# Patient Record
Sex: MALE | Race: WHITE | NOT HISPANIC OR LATINO | Employment: OTHER | URBAN - METROPOLITAN AREA
[De-identification: names, ages, dates, MRNs, and addresses within clinical notes are randomized per-mention and may not be internally consistent; named-entity substitution may affect disease eponyms.]

---

## 2023-10-12 ENCOUNTER — APPOINTMENT (OUTPATIENT)
Dept: OTOLARYNGOLOGY | Facility: CLINIC | Age: 88
End: 2023-10-12
Payer: MEDICARE

## 2023-11-05 PROBLEM — M25.561 PAIN IN RIGHT KNEE: Status: ACTIVE | Noted: 2023-11-05

## 2023-11-05 PROBLEM — R00.2 PALPITATIONS: Status: ACTIVE | Noted: 2023-11-05

## 2023-11-05 PROBLEM — I10 ESSENTIAL HYPERTENSION: Status: ACTIVE | Noted: 2023-11-05

## 2023-11-05 PROBLEM — I25.118 ATHEROSCLEROTIC HEART DISEASE OF NATIVE CORONARY ARTERY WITH OTHER FORMS OF ANGINA PECTORIS (CMS-HCC): Status: ACTIVE | Noted: 2023-11-05

## 2023-11-05 PROBLEM — I48.20 CHRONIC ATRIAL FIBRILLATION (MULTI): Status: ACTIVE | Noted: 2023-11-05

## 2023-11-05 PROBLEM — M25.562 PAIN IN LEFT KNEE: Status: ACTIVE | Noted: 2023-11-05

## 2023-11-05 PROBLEM — M17.11 OSTEOARTHRITIS OF RIGHT KNEE: Status: ACTIVE | Noted: 2023-11-05

## 2023-11-05 PROBLEM — I50.21 ACUTE SYSTOLIC HEART FAILURE (MULTI): Status: ACTIVE | Noted: 2023-11-05

## 2023-11-05 PROBLEM — M47.812 NECK ARTHRITIS: Status: ACTIVE | Noted: 2023-11-05

## 2023-11-05 PROBLEM — E03.9 HYPOTHYROID: Status: ACTIVE | Noted: 2023-11-05

## 2023-11-05 PROBLEM — R26.89 POOR BALANCE: Status: ACTIVE | Noted: 2023-11-05

## 2023-11-05 PROBLEM — M17.12 OSTEOARTHRITIS OF LEFT KNEE: Status: ACTIVE | Noted: 2023-11-05

## 2023-11-05 PROBLEM — M10.9 GOUT: Status: ACTIVE | Noted: 2023-11-05

## 2023-11-05 PROBLEM — M15.9 OSTEOARTHRITIS OF MULTIPLE JOINTS: Status: ACTIVE | Noted: 2023-11-05

## 2023-11-05 PROBLEM — N40.0 BENIGN PROSTATIC HYPERPLASIA: Status: ACTIVE | Noted: 2023-11-05

## 2023-11-05 PROBLEM — R26.81 GAIT INSTABILITY: Status: ACTIVE | Noted: 2023-11-05

## 2023-11-05 PROBLEM — N20.0 NEPHROLITHIASIS: Status: ACTIVE | Noted: 2023-11-05

## 2023-11-05 PROBLEM — M85.89 OTHER SPECIFIED DISORDERS OF BONE DENSITY AND STRUCTURE, MULTIPLE SITES: Status: ACTIVE | Noted: 2023-11-05

## 2023-11-05 PROBLEM — R06.02 SHORTNESS OF BREATH: Status: ACTIVE | Noted: 2023-11-05

## 2023-11-05 RX ORDER — ALLOPURINOL 100 MG/1
1 TABLET ORAL DAILY
COMMUNITY
Start: 2022-07-01

## 2023-11-05 RX ORDER — CARVEDILOL 3.12 MG/1
1 TABLET ORAL
COMMUNITY
Start: 2022-07-01

## 2023-11-05 RX ORDER — ATENOLOL AND CHLORTHALIDONE TABLET 50; 25 MG/1; MG/1
1 TABLET ORAL DAILY
COMMUNITY

## 2023-11-05 RX ORDER — METOLAZONE 5 MG/1
5 TABLET ORAL 2 TIMES WEEKLY
COMMUNITY
Start: 2022-07-01

## 2023-11-05 RX ORDER — LEVOTHYROXINE SODIUM 25 UG/1
1 TABLET ORAL DAILY
COMMUNITY
Start: 2022-07-01

## 2023-11-05 RX ORDER — SPIRONOLACTONE 25 MG/1
12.5 TABLET ORAL DAILY
COMMUNITY

## 2023-11-05 RX ORDER — ATENOLOL AND CHLORTHALIDONE TABLET 100; 25 MG/1; MG/1
1 TABLET ORAL DAILY
COMMUNITY

## 2023-11-05 RX ORDER — POTASSIUM CHLORIDE 750 MG/1
1 TABLET, FILM COATED, EXTENDED RELEASE ORAL DAILY
COMMUNITY
Start: 2022-10-14

## 2023-11-05 RX ORDER — TORSEMIDE 20 MG/1
20 TABLET ORAL 2 TIMES DAILY
COMMUNITY
Start: 2023-09-20 | End: 2023-12-19

## 2023-11-05 RX ORDER — COLCHICINE 0.6 MG/1
0.6 TABLET ORAL 2 TIMES WEEKLY
COMMUNITY
Start: 2022-10-14

## 2023-11-07 ENCOUNTER — CLINICAL SUPPORT (OUTPATIENT)
Dept: AUDIOLOGY | Facility: CLINIC | Age: 88
End: 2023-11-07
Payer: MEDICARE

## 2023-11-07 ENCOUNTER — OFFICE VISIT (OUTPATIENT)
Dept: OTOLARYNGOLOGY | Facility: CLINIC | Age: 88
End: 2023-11-07
Payer: MEDICARE

## 2023-11-07 VITALS — WEIGHT: 202 LBS

## 2023-11-07 DIAGNOSIS — H90.3 SENSORINEURAL HEARING LOSS (SNHL) OF BOTH EARS: ICD-10-CM

## 2023-11-07 DIAGNOSIS — H90.3 ASYMMETRICAL SENSORINEURAL HEARING LOSS: Primary | ICD-10-CM

## 2023-11-07 DIAGNOSIS — R13.14 PHARYNGOESOPHAGEAL DYSPHAGIA: Primary | ICD-10-CM

## 2023-11-07 DIAGNOSIS — H61.23 BILATERAL IMPACTED CERUMEN: ICD-10-CM

## 2023-11-07 PROCEDURE — 92567 TYMPANOMETRY: CPT | Performed by: AUDIOLOGIST

## 2023-11-07 PROCEDURE — 1036F TOBACCO NON-USER: CPT | Performed by: OTOLARYNGOLOGY

## 2023-11-07 PROCEDURE — 92557 COMPREHENSIVE HEARING TEST: CPT | Performed by: AUDIOLOGIST

## 2023-11-07 PROCEDURE — 99203 OFFICE O/P NEW LOW 30 MIN: CPT | Performed by: OTOLARYNGOLOGY

## 2023-11-07 PROCEDURE — 1159F MED LIST DOCD IN RCRD: CPT | Performed by: OTOLARYNGOLOGY

## 2023-11-07 NOTE — PROGRESS NOTES
Chief Complaint   Patient presents with    Hearing Problem     NP- HEARING CK, HAD AUDIO DONE      HPI  Uzair Enrique is a 89 y.o. male who is here for hearing loss.  He has bilateral severe sensorineural hearing loss with poor word recognition of 20% on the right and 64% on the left.  He has hearing aids from [employer] that are 1-year-old.  He is doing okay with this.  He has occasional difficulty swallowing meat.  No change in voice      No past medical history on file.         Medications:     Current Outpatient Medications:     allopurinol (Zyloprim) 100 mg tablet, Take 1 tablet (100 mg) by mouth once daily., Disp: , Rfl:     apixaban (Eliquis) 2.5 mg tablet, Take 1 tablet (2.5 mg) by mouth 2 times a day., Disp: , Rfl:     atenoloL-chlorthalidone (Tenoretic 100) 100-25 mg tablet, Take 1 tablet by mouth once daily., Disp: , Rfl:     atenoloL-chlorthalidone (Tenoretic) 50-25 mg tablet, Take 1 tablet by mouth once daily., Disp: , Rfl:     carvedilol (Coreg) 3.125 mg tablet, Take 1 tablet (3.125 mg) by mouth 2 times a day with meals., Disp: , Rfl:     colchicine, gout, 0.6 mg tablet, Take 1 tablet (0.6 mg) by mouth 2 times a week., Disp: , Rfl:     levothyroxine (Synthroid, Levoxyl) 25 mcg tablet, Take 1 tablet (25 mcg) by mouth once daily., Disp: , Rfl:     metOLazone (Zaroxolyn) 5 mg tablet, Take 1 tablet (5 mg) by mouth 2 times a week., Disp: , Rfl:     potassium chloride CR 10 mEq ER tablet, Take 1 tablet (10 mEq) by mouth once daily., Disp: , Rfl:     spironolactone (Aldactone) 25 mg tablet, Take 0.5 tablets (12.5 mg) by mouth once daily., Disp: , Rfl:     torsemide (Demadex) 20 mg tablet, Take 1 tablet (20 mg) by mouth twice a day., Disp: , Rfl:      Allergies:  Allergies   Allergen Reactions    Penicillins Other     Severe arthralgia     Shrimp GI Upset    Nsaids (Non-Steroidal Anti-Inflammatory Drug) Hives    Simvastatin Myalgia and Unknown    Iodine Unknown    Penicillamine Unknown    Shellfish Derived  Unknown    Tolmetin Unknown        Physical Exam:  Last Recorded Vitals  Weight 91.6 kg (202 lb).  []General appearance: Well-developed, well-nourished in no acute distress, conversant with normal voice quality    Head/face: No erythema or edema or facial tenderness, and normal facial nerve function bilaterally    External ear: Clear external auditory canals with normal pinnae bilateral dry cerumen removed from the deep portion of the inner ear canal.  Tube status: N/A  Middle ear: Tympanic membranes intact and mobile, middle ears normal.  Tympanic membrane perforation: N/A  Mastoid bowl: N/A  Hearing: Normal conversational awareness at normal speech thresholds    Nose visualized using: Anterior rhinoscopy  Nasal dorsum: Nontraumatic midline appearance  Septum: Midline, nonobstructing  Inferior turbinates: Normal, pink  Secretions: Dry    Oral cavity and oropharynx: Normal  Teeth: Good condition  Floor of mouth: without lesions  Palate: Normal hard palate, soft palate and uvula  Oropharynx: Clear, no lesions present  Buccal mucosa: Normal without masses or lesions  Lips: Normal    Nasopharynx: Inadequate mirror exam secondary to gag/anatomy    Neck:  Salivary glands: Normal bilateral parotid and submandibular glands by inspection and palpation.  Non-thyroid masses: No palpable masses or significant lymphadenopathy  Trachea: Midline  Thyroid: No thyromegaly or palpable nodules  Temporomandibular joint: Nontender  Cervical range of motion: Normal    Neurologic exam: Alert and oriented x3, appropriate affect.  Cranial nerves II-XII normal bilaterally  Extraocular movement: Extraocular movement intact, normal gaze alignment        Problem List Items Addressed This Visit    None  Visit Diagnoses         Codes    Pharyngoesophageal dysphagia    -  Primary R13.14    Sensorineural hearing loss (SNHL) of both ears     H90.3    Bilateral impacted cerumen     H61.23          We discussed the options of cochlear implant.  He is  not interested.  I offered a modified barium swallow and he has deferred.  Continue with hearing aids.  Recheck in 1 year    Grupo Costa MD

## 2023-11-10 NOTE — PROGRESS NOTES
AUDIOLOGY ADULT AUDIOMETRIC EVALUATION    Name:  Uzair Enrique  :  1930  Age:  89 y.o.  Date of Evaluation:  2023    Reason for visit: Patient is seen in the clinic today at the request of Grupo Costa MD in otolaryngology for an audiologic evaluation.     HISTORY  The patient has [employer] hearing aids that are approximately one year old.  He reported that he feels like his ears are blocked, and he hears water in his ears.  Otalgia, aural pressure and tinnitus were denied.  He is occasionally off balance.  A history of excessive noise exposure was reported.    EVALUATION  See scanned audiogram: “Media” > “Audiology Report”.    RESULTS  Otoscopic Evaluation:  Right Ear: clear ear canal  Left Ear: clear ear canal    Immittance Measures:  Tympanometry:  Right Ear: Type Ad, hypercompliant tympanic membrane mobility with normal middle ear pressure   Left Ear: Type A, normal tympanic membrane mobility with normal middle ear pressure     Acoustic Reflexes:  Ipsilateral Right Ear: Could not evaluate since an adequate seal could not be maintained    Ipsilateral Left Ear: Could not evaluate since an adequate seal could not be maintained    Contralateral Right Ear: did not evaluate  Contralateral Left Ear: did not evaluate    Distortion Product Otoacoustic Emissions (DPOAEs):  Right Ear: did not evaluate   Left Ear: did not evaluate     Audiometry:  Test Technique and Reliability:   Standard audiometry via supra-aural headphones. Reliability is good.    Pure tone air and bone conduction audiometry:  Right Ear: moderate sloping to profound sensorineural hearing loss   Left Ear: mild sloping to profound sensorineural hearing loss     Speech Audiometry (Word Recognition Scores):   Right Ear: Extremely Poor, 20%  Left Ear: Poor, 64%    IMPRESSIONS  The patient has a significant bilateral sensorineural hearing loss with poor word recognition in both ears.      RECOMMENDATIONS  - Follow up with  otolaryngology today as scheduled.  - Consider cochlear implant evaluation.  - Annual audiologic evaluation, sooner if an acute change is noted.  - Continued hearing aid use.  - Follow-up with audiology annually for routine hearing aid maintenance, sooner if questions/problems arise.    PATIENT EDUCATION  Discussed results, impressions and recommendations with the patient. Questions were addressed and the patient was encouraged to contact our office should concerns arise.    Time for this encounter: 1:45-2:15

## 2024-05-11 ENCOUNTER — DOCUMENTATION (OUTPATIENT)
Dept: HOME HEALTH SERVICES | Facility: HOME HEALTH | Age: 89
End: 2024-05-11
Payer: MEDICARE

## 2024-05-11 ENCOUNTER — HOME HEALTH ADMISSION (OUTPATIENT)
Dept: HOME HEALTH SERVICES | Facility: HOME HEALTH | Age: 89
End: 2024-05-11
Payer: MEDICARE

## 2024-05-11 NOTE — HH CARE COORDINATION
Home Care received a Referral for Physical Therapy. We have processed the referral for a Start of Care on 5.12 or 5.13.24.     If you have any questions or concerns, please feel free to contact us at [phone]. Follow the prompts, enter your five digit zip code, and you will be directed to your care team on [facility].

## 2024-08-21 ENCOUNTER — NURSING HOME VISIT (OUTPATIENT)
Dept: POST ACUTE CARE | Facility: EXTERNAL LOCATION | Age: 89
End: 2024-08-21
Payer: MEDICARE

## 2024-08-21 VITALS
TEMPERATURE: 98.4 F | DIASTOLIC BLOOD PRESSURE: 74 MMHG | OXYGEN SATURATION: 95 % | HEART RATE: 69 BPM | SYSTOLIC BLOOD PRESSURE: 132 MMHG | RESPIRATION RATE: 18 BRPM

## 2024-08-21 DIAGNOSIS — N30.00 ACUTE CYSTITIS WITHOUT HEMATURIA: ICD-10-CM

## 2024-08-21 DIAGNOSIS — S72.001A CLOSED FRACTURE OF NECK OF RIGHT FEMUR, INITIAL ENCOUNTER (MULTI): Primary | ICD-10-CM

## 2024-08-21 DIAGNOSIS — S06.5X0S: ICD-10-CM

## 2024-08-21 PROCEDURE — 99309 SBSQ NF CARE MODERATE MDM 30: CPT | Performed by: NURSE PRACTITIONER

## 2024-08-21 RX ORDER — DOCUSATE SODIUM 100 MG/1
100 CAPSULE, LIQUID FILLED ORAL 2 TIMES DAILY
COMMUNITY

## 2024-08-21 RX ORDER — DIGOXIN 125 MCG
125 TABLET ORAL
COMMUNITY

## 2024-08-21 RX ORDER — FUROSEMIDE 80 MG/1
80 TABLET ORAL
COMMUNITY

## 2024-08-21 RX ORDER — METHOCARBAMOL 500 MG/1
500 TABLET, FILM COATED ORAL 3 TIMES DAILY PRN
COMMUNITY

## 2024-08-21 RX ORDER — TAMSULOSIN HYDROCHLORIDE 0.4 MG/1
0.4 CAPSULE ORAL DAILY
COMMUNITY

## 2024-08-21 NOTE — LETTER
Patient: Uzair Enrique  : 1930    Encounter Date: 2024    Subjective  Patient ID: Uzair Enrique is a 89 y.o. male who presents for Hip Injury.    Following up with patient who has come to [facility] following hospitalization for right femoral neck fracture requiring surgery. He had a right hip hemiarthroplasty on 24. He also came with orders for antibiotics for a UTI. He was under the care of hospice until the day of hospital admission. He currently denies pain. He refuses assessment and to take his morning medication from the nursing staff. There is mention of a trace SAH that had been found on a prior CT scan. He was re scanned prior to surgery and found to have a probably meningioma which he is to have evaluated after rehab. He has a history of CKD and his GFR at the hospital was 30.          Review of Systems   Unable to perform ROS: Other       Objective  /74   Pulse 69   Temp 36.9 °C (98.4 °F)   Resp 18   SpO2 95%     Physical Exam  Constitutional:       General: He is not in acute distress.  HENT:      Mouth/Throat:      Mouth: Mucous membranes are dry.   Eyes:      Conjunctiva/sclera: Conjunctivae normal.   Pulmonary:      Effort: Pulmonary effort is normal.   Musculoskeletal:      Comments: Needs assistance to be pulled up in bed   Skin:     General: Skin is warm and dry.      Comments: Multiple dressings on BUE    Neurological:      Mental Status: He is alert.   Psychiatric:         Mood and Affect: Affect is angry.         Assessment/Plan  Diagnoses and all orders for this visit:  Closed fracture of neck of right femur, initial encounter (Multi)  Comments:  PT/OT, medications and follow up as scheduled  Traumatic subdural hemorrhage without loss of consciousness, sequela (CMS-[facility])  Comments:  monitor for changes  Acute cystitis without hematuria  Comments:  encourage antibiotics, fluids           Electronically Signed By: BLU Ryan   24  1:44 PM

## 2024-08-21 NOTE — PROGRESS NOTES
Subjective   Patient ID: Uzair Enrique is a 89 y.o. male who presents for Hip Injury.    Following up with patient who has come to [facility] following hospitalization for right femoral neck fracture requiring surgery. He had a right hip hemiarthroplasty on 8/14/24. He also came with orders for antibiotics for a UTI. He was under the care of hospice until the day of hospital admission. He currently denies pain. He refuses assessment and to take his morning medication from the nursing staff. There is mention of a trace SAH that had been found on a prior CT scan. He was re scanned prior to surgery and found to have a probably meningioma which he is to have evaluated after rehab. He has a history of CKD and his GFR at the hospital was 30.          Review of Systems   Unable to perform ROS: Other       Objective   /74   Pulse 69   Temp 36.9 °C (98.4 °F)   Resp 18   SpO2 95%     Physical Exam  Constitutional:       General: He is not in acute distress.  HENT:      Mouth/Throat:      Mouth: Mucous membranes are dry.   Eyes:      Conjunctiva/sclera: Conjunctivae normal.   Pulmonary:      Effort: Pulmonary effort is normal.   Musculoskeletal:      Comments: Needs assistance to be pulled up in bed   Skin:     General: Skin is warm and dry.      Comments: Multiple dressings on BUE    Neurological:      Mental Status: He is alert.   Psychiatric:         Mood and Affect: Affect is angry.         Assessment/Plan   Diagnoses and all orders for this visit:  Closed fracture of neck of right femur, initial encounter (Multi)  Comments:  PT/OT, medications and follow up as scheduled  Traumatic subdural hemorrhage without loss of consciousness, sequela (CMS-[facility])  Comments:  monitor for changes  Acute cystitis without hematuria  Comments:  encourage antibiotics, fluids

## 2024-08-23 ENCOUNTER — NURSING HOME VISIT (OUTPATIENT)
Dept: POST ACUTE CARE | Facility: EXTERNAL LOCATION | Age: 89
End: 2024-08-23
Payer: MEDICARE

## 2024-08-23 DIAGNOSIS — S06.6XAD TRAUMATIC SUBARACHNOID HEMORRHAGE WITH UNKNOWN LOSS OF CONSCIOUSNESS STATUS, SUBSEQUENT ENCOUNTER: ICD-10-CM

## 2024-08-23 DIAGNOSIS — Z87.81 S/P RIGHT HIP FRACTURE: Primary | ICD-10-CM

## 2024-08-23 DIAGNOSIS — I48.20 CHRONIC ATRIAL FIBRILLATION (MULTI): ICD-10-CM

## 2024-08-23 DIAGNOSIS — I25.118 ATHEROSCLEROTIC HEART DISEASE OF NATIVE CORONARY ARTERY WITH OTHER FORMS OF ANGINA PECTORIS (CMS-HCC): ICD-10-CM

## 2024-08-23 DIAGNOSIS — E03.9 ACQUIRED HYPOTHYROIDISM: ICD-10-CM

## 2024-08-23 DIAGNOSIS — I10 ESSENTIAL HYPERTENSION: ICD-10-CM

## 2024-08-23 PROCEDURE — 99305 1ST NF CARE MODERATE MDM 35: CPT | Performed by: FAMILY MEDICINE

## 2024-08-23 NOTE — LETTER
Patient: Uzair Enrique  : 1930    Encounter Date: 2024    Subjective  Patient ID: Uzair Enrique is a 89 y.o. male who is acute skilled care being seen and evaluated for multiple medical problems.    HPI patient here for skilled services after recent hospital stay with right hip fracture as well as trace subarachnoid hemorrhage.  He had a mechanical fall and hit his head slightly as well.  He underwent hemiarthroplasty on .  Was diagnosed with urinary tract infection as well and is completing antibiotic treatment.  Continues with PT and OT.  He did refuse laboratory studies upon arrival here.  He has follow-up scheduled with Ortho for next week.  No perioperative issues with chest pain or worsening shortness of breath.  No fever sweats or chills.  Signs or symptoms of DVT at this time.    Review of Systems   Constitutional:  Negative for chills, fatigue and fever.   HENT:  Negative for congestion and sore throat.    Eyes:  Negative for visual disturbance.   Respiratory:  Negative for cough and shortness of breath.    Cardiovascular:  Negative for chest pain.   Gastrointestinal:  Negative for abdominal pain, constipation, diarrhea, nausea and vomiting.   Genitourinary:  Negative for dysuria and hematuria.   Musculoskeletal:  Positive for arthralgias and gait problem. Negative for myalgias.   Skin:  Negative for rash.   Neurological:  Negative for headaches.       Objective  There were no vitals taken for this visit.    Physical Exam  Constitutional:       General: He is not in acute distress.     Appearance: Normal appearance.   HENT:      Head: Normocephalic.      Mouth/Throat:      Mouth: Mucous membranes are moist.      Pharynx: Oropharynx is clear.   Eyes:      Extraocular Movements: Extraocular movements intact.      Pupils: Pupils are equal, round, and reactive to light.   Cardiovascular:      Rate and Rhythm: Normal rate. Rhythm irregular.      Heart sounds: Normal heart sounds.    Pulmonary:      Effort: Pulmonary effort is normal.      Breath sounds: Normal breath sounds. No wheezing or rhonchi.   Abdominal:      General: There is no distension.      Palpations: Abdomen is soft.      Tenderness: There is no abdominal tenderness.   Musculoskeletal:         General: Tenderness present.      Right lower leg: No edema.      Left lower leg: No edema.   Lymphadenopathy:      Cervical: No cervical adenopathy.   Skin:     Coloration: Skin is not jaundiced.      Findings: Bruising present. No rash.   Neurological:      Mental Status: He is alert. Mental status is at baseline.      Cranial Nerves: No cranial nerve deficit.      Motor: Weakness present.      Gait: Gait abnormal.   Psychiatric:         Mood and Affect: Mood normal.         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     Goals    None           Electronically Signed By: Jessica Rodriguez MD   8/26/24  1:33 PM

## 2024-08-26 ENCOUNTER — NURSING HOME VISIT (OUTPATIENT)
Dept: POST ACUTE CARE | Facility: EXTERNAL LOCATION | Age: 89
End: 2024-08-26
Payer: MEDICARE

## 2024-08-26 DIAGNOSIS — I10 ESSENTIAL HYPERTENSION: Primary | ICD-10-CM

## 2024-08-26 DIAGNOSIS — E03.9 ACQUIRED HYPOTHYROIDISM: ICD-10-CM

## 2024-08-26 DIAGNOSIS — I48.20 CHRONIC ATRIAL FIBRILLATION (MULTI): ICD-10-CM

## 2024-08-26 DIAGNOSIS — I25.118 ATHEROSCLEROTIC HEART DISEASE OF NATIVE CORONARY ARTERY WITH OTHER FORMS OF ANGINA PECTORIS (CMS-HCC): ICD-10-CM

## 2024-08-26 PROCEDURE — 99308 SBSQ NF CARE LOW MDM 20: CPT | Performed by: FAMILY MEDICINE

## 2024-08-26 ASSESSMENT — ENCOUNTER SYMPTOMS
FATIGUE: 0
SORE THROAT: 0
MYALGIAS: 0
DIARRHEA: 0
SHORTNESS OF BREATH: 0
NAUSEA: 0
ARTHRALGIAS: 1
COUGH: 0
HEADACHES: 0
VOMITING: 0
HEMATURIA: 0
DYSURIA: 0
ABDOMINAL PAIN: 0
CONSTIPATION: 0
CHILLS: 0
FEVER: 0

## 2024-08-26 NOTE — PROGRESS NOTES
Subjective   Patient ID: Uzair Enrique is a 89 y.o. male who is acute skilled care being seen and evaluated for multiple medical problems.    HPI patient here for skilled services after recent hospital stay with right hip fracture as well as trace subarachnoid hemorrhage.  He had a mechanical fall and hit his head slightly as well.  He underwent hemiarthroplasty on August 14.  Was diagnosed with urinary tract infection as well and is completing antibiotic treatment.  Continues with PT and OT.  He did refuse laboratory studies upon arrival here.  He has follow-up scheduled with Ortho for next week.  No perioperative issues with chest pain or worsening shortness of breath.  No fever sweats or chills.  Signs or symptoms of DVT at this time.    Review of Systems   Constitutional:  Negative for chills, fatigue and fever.   HENT:  Negative for congestion and sore throat.    Eyes:  Negative for visual disturbance.   Respiratory:  Negative for cough and shortness of breath.    Cardiovascular:  Negative for chest pain.   Gastrointestinal:  Negative for abdominal pain, constipation, diarrhea, nausea and vomiting.   Genitourinary:  Negative for dysuria and hematuria.   Musculoskeletal:  Positive for arthralgias and gait problem. Negative for myalgias.   Skin:  Negative for rash.   Neurological:  Negative for headaches.       Objective   There were no vitals taken for this visit.    Physical Exam  Constitutional:       General: He is not in acute distress.     Appearance: Normal appearance.   HENT:      Head: Normocephalic.      Mouth/Throat:      Mouth: Mucous membranes are moist.      Pharynx: Oropharynx is clear.   Eyes:      Extraocular Movements: Extraocular movements intact.      Pupils: Pupils are equal, round, and reactive to light.   Cardiovascular:      Rate and Rhythm: Normal rate. Rhythm irregular.      Heart sounds: Normal heart sounds.   Pulmonary:      Effort: Pulmonary effort is normal.      Breath sounds:  Normal breath sounds. No wheezing or rhonchi.   Abdominal:      General: There is no distension.      Palpations: Abdomen is soft.      Tenderness: There is no abdominal tenderness.   Musculoskeletal:         General: Tenderness present.      Right lower leg: No edema.      Left lower leg: No edema.   Lymphadenopathy:      Cervical: No cervical adenopathy.   Skin:     Coloration: Skin is not jaundiced.      Findings: Bruising present. No rash.   Neurological:      Mental Status: He is alert. Mental status is at baseline.      Cranial Nerves: No cranial nerve deficit.      Motor: Weakness present.      Gait: Gait abnormal.   Psychiatric:         Mood and Affect: Mood normal.         Assessment/Plan   Problem List Items Addressed This Visit             ICD-10-CM    Atherosclerotic heart disease of native coronary artery with other forms of angina pectoris (CMS-HCC) I25.118    Chronic atrial fibrillation (Multi) I48.20    Essential hypertension I10    Hypothyroid E03.9     Other Visit Diagnoses         Codes    S/P right hip fracture    -  Primary Z87.81    Traumatic subarachnoid hemorrhage with unknown loss of consciousness status, subsequent encounter     S06.6XAD        Continue with PT and OT, he refused labs, monitor renal function as allowed and recheck CBC.     Goals    None

## 2024-08-26 NOTE — LETTER
Patient: zUair Enrique  : 1930    Encounter Date: 2024    Subjective  Patient ID: Uzair Enrique is a 89 y.o. male who is acute skilled care being seen and evaluated for multiple medical problems.    HPI patient here for skilled services after recent hospital stay with right hip fracture as well as trace subarachnoid hemorrhage.  He had a mechanical fall and hit his head slightly as well.  He underwent hemiarthroplasty on .  Was diagnosed with urinary tract infection as well and is completing antibiotic treatment.  Continues with PT and OT.  He again has refused laboratory studies.  Again has refused laboratory studies.  He has Ortho appointment scheduled for this week.    Review of Systems   Constitutional:  Negative for chills, fatigue and fever.   HENT:  Negative for congestion and sore throat.    Eyes:  Negative for visual disturbance.   Respiratory:  Negative for cough and shortness of breath.    Cardiovascular:  Negative for chest pain.   Gastrointestinal:  Negative for abdominal pain, constipation, diarrhea, nausea and vomiting.   Genitourinary:  Negative for dysuria and hematuria.   Musculoskeletal:  Positive for arthralgias and gait problem. Negative for myalgias.   Skin:  Negative for rash.   Neurological:  Negative for headaches.       Objective  There were no vitals taken for this visit.    Physical Exam  Vitals reviewed: 112/59 72 97.6 wt 186.   Constitutional:       General: He is not in acute distress.     Appearance: Normal appearance.   HENT:      Head: Normocephalic.      Mouth/Throat:      Mouth: Mucous membranes are moist.      Pharynx: Oropharynx is clear.   Eyes:      Extraocular Movements: Extraocular movements intact.      Pupils: Pupils are equal, round, and reactive to light.   Cardiovascular:      Rate and Rhythm: Normal rate. Rhythm irregular.      Heart sounds: Normal heart sounds.   Pulmonary:      Effort: Pulmonary effort is normal.      Breath sounds: Normal  breath sounds. No wheezing or rhonchi.   Abdominal:      General: There is no distension.      Palpations: Abdomen is soft.      Tenderness: There is no abdominal tenderness.   Musculoskeletal:         General: Tenderness present.      Right lower leg: No edema.      Left lower leg: No edema.   Lymphadenopathy:      Cervical: No cervical adenopathy.   Skin:     Coloration: Skin is not jaundiced.      Findings: Bruising present. No rash.   Neurological:      Mental Status: He is alert. Mental status is at baseline.      Cranial Nerves: No cranial nerve deficit.      Motor: Weakness present.      Gait: Gait abnormal.   Psychiatric:         Mood and Affect: Mood normal.         Assessment/Plan  Problem List Items Addressed This Visit             ICD-10-CM    Atherosclerotic heart disease of native coronary artery with other forms of angina pectoris (CMS-HCC) I25.118    Chronic atrial fibrillation (Multi) I48.20    Essential hypertension - Primary I10    Hypothyroid E03.9     Continue with PT and OT, he refused labs, monitor renal function as allowed and recheck CBC.     Goals    None           Electronically Signed By: Jessica Rodriguez MD   8/27/24  5:21 PM

## 2024-08-27 ASSESSMENT — ENCOUNTER SYMPTOMS
HEADACHES: 0
VOMITING: 0
NAUSEA: 0
MYALGIAS: 0
ABDOMINAL PAIN: 0
FEVER: 0
DYSURIA: 0
DIARRHEA: 0
CONSTIPATION: 0
HEMATURIA: 0
SORE THROAT: 0
ARTHRALGIAS: 1
FATIGUE: 0
SHORTNESS OF BREATH: 0
COUGH: 0
CHILLS: 0

## 2024-08-27 NOTE — PROGRESS NOTES
Subjective   Patient ID: Uzair Enrique is a 89 y.o. male who is acute skilled care being seen and evaluated for multiple medical problems.    HPI patient here for skilled services after recent hospital stay with right hip fracture as well as trace subarachnoid hemorrhage.  He had a mechanical fall and hit his head slightly as well.  He underwent hemiarthroplasty on August 14.  Was diagnosed with urinary tract infection as well and is completing antibiotic treatment.  Continues with PT and OT.  He again has refused laboratory studies.  Again has refused laboratory studies.  He has Ortho appointment scheduled for this week.    Review of Systems   Constitutional:  Negative for chills, fatigue and fever.   HENT:  Negative for congestion and sore throat.    Eyes:  Negative for visual disturbance.   Respiratory:  Negative for cough and shortness of breath.    Cardiovascular:  Negative for chest pain.   Gastrointestinal:  Negative for abdominal pain, constipation, diarrhea, nausea and vomiting.   Genitourinary:  Negative for dysuria and hematuria.   Musculoskeletal:  Positive for arthralgias and gait problem. Negative for myalgias.   Skin:  Negative for rash.   Neurological:  Negative for headaches.       Objective   There were no vitals taken for this visit.    Physical Exam  Vitals reviewed: 112/59 72 97.6 wt 186.   Constitutional:       General: He is not in acute distress.     Appearance: Normal appearance.   HENT:      Head: Normocephalic.      Mouth/Throat:      Mouth: Mucous membranes are moist.      Pharynx: Oropharynx is clear.   Eyes:      Extraocular Movements: Extraocular movements intact.      Pupils: Pupils are equal, round, and reactive to light.   Cardiovascular:      Rate and Rhythm: Normal rate. Rhythm irregular.      Heart sounds: Normal heart sounds.   Pulmonary:      Effort: Pulmonary effort is normal.      Breath sounds: Normal breath sounds. No wheezing or rhonchi.   Abdominal:      General: There  is no distension.      Palpations: Abdomen is soft.      Tenderness: There is no abdominal tenderness.   Musculoskeletal:         General: Tenderness present.      Right lower leg: No edema.      Left lower leg: No edema.   Lymphadenopathy:      Cervical: No cervical adenopathy.   Skin:     Coloration: Skin is not jaundiced.      Findings: Bruising present. No rash.   Neurological:      Mental Status: He is alert. Mental status is at baseline.      Cranial Nerves: No cranial nerve deficit.      Motor: Weakness present.      Gait: Gait abnormal.   Psychiatric:         Mood and Affect: Mood normal.         Assessment/Plan   Problem List Items Addressed This Visit             ICD-10-CM    Atherosclerotic heart disease of native coronary artery with other forms of angina pectoris (CMS-HCC) I25.118    Chronic atrial fibrillation (Multi) I48.20    Essential hypertension - Primary I10    Hypothyroid E03.9     Continue with PT and OT, he refused labs, monitor renal function as allowed and recheck CBC.     Goals    None

## 2024-08-29 ENCOUNTER — NURSING HOME VISIT (OUTPATIENT)
Dept: POST ACUTE CARE | Facility: EXTERNAL LOCATION | Age: 89
End: 2024-08-29
Payer: MEDICARE

## 2024-08-29 VITALS
HEART RATE: 64 BPM | OXYGEN SATURATION: 93 % | WEIGHT: 186.8 LBS | RESPIRATION RATE: 18 BRPM | TEMPERATURE: 98.2 F | SYSTOLIC BLOOD PRESSURE: 129 MMHG | DIASTOLIC BLOOD PRESSURE: 66 MMHG

## 2024-08-29 DIAGNOSIS — S31.000D WOUND OF SACRAL REGION, SUBSEQUENT ENCOUNTER: ICD-10-CM

## 2024-08-29 DIAGNOSIS — S06.6XAD TRAUMATIC SUBARACHNOID HEMORRHAGE WITH UNKNOWN LOSS OF CONSCIOUSNESS STATUS, SUBSEQUENT ENCOUNTER: ICD-10-CM

## 2024-08-29 DIAGNOSIS — Z87.81 S/P RIGHT HIP FRACTURE: Primary | ICD-10-CM

## 2024-08-29 PROCEDURE — 99310 SBSQ NF CARE HIGH MDM 45: CPT | Performed by: NURSE PRACTITIONER

## 2024-08-29 ASSESSMENT — ENCOUNTER SYMPTOMS
SHORTNESS OF BREATH: 0
FEVER: 0
WOUND: 1
CONSTIPATION: 0
DIARRHEA: 0
CHILLS: 0
COUGH: 0
ARTHRALGIAS: 1

## 2024-08-29 NOTE — LETTER
Patient: Uzair Enrique  : 1930    Encounter Date: 2024    Subjective  Patient ID: Uzair Enrique is a 89 y.o. male who presents for Hip Injury.    Following up with patient who came to [facility] following hospital stay for right hip fracture with surgical repair. Wife is present today for discussion. He has been taking his medications and is open to assessment. Patient had virtual appointment with ortho nurse this week and wife states that staples can be removed next week. Dressing remains intact. He denies fever and chills. He will follow up with ortho in October. He continues to participate in therapy. Patient had a MOCA performed in which he scored a 7/30. He admits to not wanting to do the testing and did not try his best. He states that he feels better and would possibly complete another testing but only if he had to. Discussed mentation at home and wife and patient deny him having memory changes. He did have a KONSTANTIN done in 2024 which was .     Patient has a small open area on his coccyx which is being treated with Triad and foam dressing daily.     Patient denies changes in appetite. He says that he has been drinking fluids throughout the day. He is sleeping well at night. Wif states that the plan is to discharge home with either hospice or [facility] depending on how well he is doing at that time. He is to have a CT scan of his head in follow up to his SAH and possible meningioma but wife states that they will do this after he returns home.     Medications reviewed in PCC. Labs refused per patient on 24    >40 minutes spend in care coordination, chart review, discussion with disciplines and F2F care         Review of Systems   Constitutional:  Negative for chills and fever.   Respiratory:  Negative for cough and shortness of breath.    Gastrointestinal:  Negative for constipation and diarrhea.   Musculoskeletal:  Positive for arthralgias.   Skin:  Positive for wound.    All other systems reviewed and are negative.      Objective  /66   Pulse 64   Temp 36.8 °C (98.2 °F)   Resp 18   Wt 84.7 kg (186 lb 12.8 oz)   SpO2 93%     Physical Exam  Constitutional:       General: He is not in acute distress.     Appearance: He is not ill-appearing.   HENT:      Mouth/Throat:      Mouth: Mucous membranes are moist.   Eyes:      Conjunctiva/sclera: Conjunctivae normal.   Pulmonary:      Effort: Pulmonary effort is normal.      Breath sounds: Normal breath sounds.   Abdominal:      General: Bowel sounds are normal. There is no distension.      Tenderness: There is no abdominal tenderness.   Musculoskeletal:      Cervical back: Decreased range of motion.      Comments: Patient needs assistance to roll side to side for skin assessment, limited ROM right hip and shoulders   Skin:     General: Skin is warm.      Comments: Right hip incision with dressing dry and intact; small open area on   Coccyx, Triad applied with Foam dressing   Neurological:      Mental Status: He is alert.      Comments: Oriented to wife, place; able to recall and converse about what he ate yesterday, his career, and perform ROS   Psychiatric:         Mood and Affect: Affect is flat.         Assessment/Plan  Diagnoses and all orders for this visit:  S/P right hip fracture  Comments:  cont with therapies, staple removal and incision care  Traumatic subarachnoid hemorrhage with unknown loss of consciousness status, subsequent encounter  Comments:  wife to schedule CT once discharged home  Wound of sacral region, subsequent encounter  Comments:  cont with triad and foam, encourage good nutrition for healing and off loading area           Electronically Signed By: BLU Ryan   8/29/24  3:21 PM

## 2024-08-29 NOTE — PROGRESS NOTES
Subjective   Patient ID: Uzair Enrique is a 89 y.o. male who presents for Hip Injury.    Following up with patient who came to [facility] following hospital stay for right hip fracture with surgical repair. Wife is present today for discussion. He has been taking his medications and is open to assessment. Patient had virtual appointment with ortho nurse this week and wife states that staples can be removed next week. Dressing remains intact. He denies fever and chills. He will follow up with ortho in October. He continues to participate in therapy. Patient had a MOCA performed in which he scored a 7/30. He admits to not wanting to do the testing and did not try his best. He states that he feels better and would possibly complete another testing but only if he had to. Discussed mentation at home and wife and patient deny him having memory changes. He did have a KONSTANTIN done in Feb 2024 which was 20/30.     Patient has a small open area on his coccyx which is being treated with Triad and foam dressing daily.     Patient denies changes in appetite. He says that he has been drinking fluids throughout the day. He is sleeping well at night. Wif states that the plan is to discharge home with either hospice or [facility] depending on how well he is doing at that time. He is to have a CT scan of his head in follow up to his SAH and possible meningioma but wife states that they will do this after he returns home.     Medications reviewed in PCC. Labs refused per patient on 8/21/24    >40 minutes spend in care coordination, chart review, discussion with disciplines and F care         Review of Systems   Constitutional:  Negative for chills and fever.   Respiratory:  Negative for cough and shortness of breath.    Gastrointestinal:  Negative for constipation and diarrhea.   Musculoskeletal:  Positive for arthralgias.   Skin:  Positive for wound.   All other systems reviewed and are negative.      Objective   /66    Pulse 64   Temp 36.8 °C (98.2 °F)   Resp 18   Wt 84.7 kg (186 lb 12.8 oz)   SpO2 93%     Physical Exam  Constitutional:       General: He is not in acute distress.     Appearance: He is not ill-appearing.   HENT:      Mouth/Throat:      Mouth: Mucous membranes are moist.   Eyes:      Conjunctiva/sclera: Conjunctivae normal.   Pulmonary:      Effort: Pulmonary effort is normal.      Breath sounds: Normal breath sounds.   Abdominal:      General: Bowel sounds are normal. There is no distension.      Tenderness: There is no abdominal tenderness.   Musculoskeletal:      Cervical back: Decreased range of motion.      Comments: Patient needs assistance to roll side to side for skin assessment, limited ROM right hip and shoulders   Skin:     General: Skin is warm.      Comments: Right hip incision with dressing dry and intact; small open area on   Coccyx, Triad applied with Foam dressing   Neurological:      Mental Status: He is alert.      Comments: Oriented to wife, place; able to recall and converse about what he ate yesterday, his career, and perform ROS   Psychiatric:         Mood and Affect: Affect is flat.         Assessment/Plan   Diagnoses and all orders for this visit:  S/P right hip fracture  Comments:  cont with therapies, staple removal and incision care  Traumatic subarachnoid hemorrhage with unknown loss of consciousness status, subsequent encounter  Comments:  wife to schedule CT once discharged home  Wound of sacral region, subsequent encounter  Comments:  cont with triad and foam, encourage good nutrition for healing and off loading area

## 2024-08-30 ENCOUNTER — NURSING HOME VISIT (OUTPATIENT)
Dept: POST ACUTE CARE | Facility: EXTERNAL LOCATION | Age: 89
End: 2024-08-30
Payer: MEDICARE

## 2024-08-30 DIAGNOSIS — S06.6XAD TRAUMATIC SUBARACHNOID HEMORRHAGE WITH UNKNOWN LOSS OF CONSCIOUSNESS STATUS, SUBSEQUENT ENCOUNTER: ICD-10-CM

## 2024-08-30 DIAGNOSIS — I10 ESSENTIAL HYPERTENSION: Primary | ICD-10-CM

## 2024-08-30 DIAGNOSIS — S06.5X0S: ICD-10-CM

## 2024-08-30 PROCEDURE — 99308 SBSQ NF CARE LOW MDM 20: CPT | Performed by: FAMILY MEDICINE

## 2024-08-30 NOTE — LETTER
Patient: Uzair Enrique  : 1930    Encounter Date: 2024    Subjective  Patient ID: Uzair Enrique is a 89 y.o. male who is acute skilled care being seen and evaluated for multiple medical problems.    HPI patient here for skilled services after recent hospital stay with right hip fracture as well as trace subarachnoid hemorrhage.  He had a mechanical fall and hit his head slightly as well.  He underwent hemiarthroplasty on .  Was diagnosed with urinary tract infection as well which is treated. Continues with PT and OT.  He refused labs and moca. Staples to come out next week .    Review of Systems   Constitutional:  Negative for chills, fatigue and fever.   HENT:  Negative for congestion and sore throat.    Eyes:  Negative for visual disturbance.   Respiratory:  Negative for cough and shortness of breath.    Cardiovascular:  Negative for chest pain.   Gastrointestinal:  Negative for abdominal pain, constipation, diarrhea, nausea and vomiting.   Genitourinary:  Negative for dysuria and hematuria.   Musculoskeletal:  Positive for arthralgias and gait problem. Negative for myalgias.   Skin:  Negative for rash.   Neurological:  Negative for headaches.       Objective  There were no vitals taken for this visit.    Physical Exam  Vitals reviewed: 129/66 98.2 64 18 wt 186.   Constitutional:       General: He is not in acute distress.     Appearance: Normal appearance.   HENT:      Head: Normocephalic.      Mouth/Throat:      Mouth: Mucous membranes are moist.      Pharynx: Oropharynx is clear.   Eyes:      Extraocular Movements: Extraocular movements intact.      Pupils: Pupils are equal, round, and reactive to light.   Cardiovascular:      Rate and Rhythm: Normal rate. Rhythm irregular.      Heart sounds: Normal heart sounds.   Pulmonary:      Effort: Pulmonary effort is normal.      Breath sounds: Normal breath sounds. No wheezing or rhonchi.   Abdominal:      General: There is no distension.       Palpations: Abdomen is soft.      Tenderness: There is no abdominal tenderness.   Musculoskeletal:         General: No tenderness.      Right lower leg: No edema.      Left lower leg: No edema.   Lymphadenopathy:      Cervical: No cervical adenopathy.   Skin:     Coloration: Skin is not jaundiced.      Findings: No rash.   Neurological:      Mental Status: He is alert. Mental status is at baseline.      Cranial Nerves: No cranial nerve deficit.      Motor: Weakness present.      Gait: Gait abnormal.   Psychiatric:         Mood and Affect: Mood normal.         Assessment/Plan  Problem List Items Addressed This Visit             ICD-10-CM    Essential hypertension - Primary I10     Other Visit Diagnoses         Codes    Traumatic subarachnoid hemorrhage with unknown loss of consciousness status, subsequent encounter     S06.6XAD    Traumatic subdural hemorrhage without loss of consciousness, sequela (CMS-HCC)     S06.5X0S            Continue with PT and OT, he refused labs, monitor renal function as allowed and recheck CBC. Staples out next week. Continue with wound care.      Goals    None           Electronically Signed By: Jessica Rodriguez MD   9/4/24  9:04 AM

## 2024-09-03 ENCOUNTER — NURSING HOME VISIT (OUTPATIENT)
Dept: POST ACUTE CARE | Facility: EXTERNAL LOCATION | Age: 89
End: 2024-09-03
Payer: MEDICARE

## 2024-09-03 VITALS
OXYGEN SATURATION: 93 % | TEMPERATURE: 98 F | HEART RATE: 62 BPM | RESPIRATION RATE: 18 BRPM | DIASTOLIC BLOOD PRESSURE: 80 MMHG | WEIGHT: 184.6 LBS | SYSTOLIC BLOOD PRESSURE: 138 MMHG

## 2024-09-03 DIAGNOSIS — Z87.81 S/P RIGHT HIP FRACTURE: Primary | ICD-10-CM

## 2024-09-03 DIAGNOSIS — R27.0 ATAXIA: ICD-10-CM

## 2024-09-03 DIAGNOSIS — D64.9 ANEMIA, UNSPECIFIED TYPE: ICD-10-CM

## 2024-09-03 PROCEDURE — 99309 SBSQ NF CARE MODERATE MDM 30: CPT | Performed by: NURSE PRACTITIONER

## 2024-09-03 ASSESSMENT — ENCOUNTER SYMPTOMS
COUGH: 0
ARTHRALGIAS: 1
SHORTNESS OF BREATH: 0
WOUND: 1

## 2024-09-03 NOTE — PROGRESS NOTES
Subjective   Patient ID: Uzair Enrique is a 89 y.o. male who presents for Hip Injury.    Following up with patient who had a fractured right hip. He is due to have staples removed today along with dressing. He denies pain to the site. He has been participating in therapies. He remains on Vitamin D 50,000 2 caps weekly. He will follow up with ortho on October. Discussed home going with wife. She is inquiring regarding a hospital bed. Patient will require a hospital bed for repositioning and ADL's that a regular bed can not provide. He also requires a hospital bed to keep HOB elevated due to CHF         Review of Systems   Respiratory:  Negative for cough and shortness of breath.    Cardiovascular:  Negative for chest pain.   Musculoskeletal:  Positive for arthralgias and gait problem.   Skin:  Positive for wound.       Objective   /80   Pulse 62   Temp 36.7 °C (98 °F)   Resp 18   Wt 83.7 kg (184 lb 9.6 oz)   SpO2 93%     Physical Exam  Constitutional:       General: He is not in acute distress.     Appearance: He is not ill-appearing.   HENT:      Mouth/Throat:      Mouth: Mucous membranes are moist.   Eyes:      Conjunctiva/sclera: Conjunctivae normal.   Pulmonary:      Effort: Pulmonary effort is normal.   Abdominal:      General: There is no distension.      Tenderness: There is no abdominal tenderness.   Musculoskeletal:      Comments: Needs assistance to turn side to side   Skin:     General: Skin is warm and dry.      Comments: Assisted with staple removal from right hip, incision clean, dry and intact, steri strips applied   Neurological:      Mental Status: He is alert.         Assessment/Plan   Diagnoses and all orders for this visit:  S/P right hip fracture  Comments:  staples removed, cont with steri strips to area, follow up with ortho, therapies  Ataxia  Comments:  hospital bed at discharge  Anemia, unspecified type  Comments:  CBC and BMP tomorrow

## 2024-09-03 NOTE — LETTER
Patient: Uzair Enrique  : 1930    Encounter Date: 2024    Subjective  Patient ID: Uzair Enrique is a 89 y.o. male who presents for Hip Injury.    Following up with patient who had a fractured right hip. He is due to have staples removed today along with dressing. He denies pain to the site. He has been participating in therapies. He remains on Vitamin D 50,000 2 caps weekly. He will follow up with ortho on October. Discussed home going with wife. She is inquiring regarding a hospital bed. Patient will require a hospital bed for repositioning and ADL's that a regular bed can not provide. He also requires a hospital bed to keep HOB elevated due to CHF         Review of Systems   Respiratory:  Negative for cough and shortness of breath.    Cardiovascular:  Negative for chest pain.   Musculoskeletal:  Positive for arthralgias and gait problem.   Skin:  Positive for wound.       Objective  /80   Pulse 62   Temp 36.7 °C (98 °F)   Resp 18   Wt 83.7 kg (184 lb 9.6 oz)   SpO2 93%     Physical Exam  Constitutional:       General: He is not in acute distress.     Appearance: He is not ill-appearing.   HENT:      Mouth/Throat:      Mouth: Mucous membranes are moist.   Eyes:      Conjunctiva/sclera: Conjunctivae normal.   Pulmonary:      Effort: Pulmonary effort is normal.   Abdominal:      General: There is no distension.      Tenderness: There is no abdominal tenderness.   Musculoskeletal:      Comments: Needs assistance to turn side to side   Skin:     General: Skin is warm and dry.      Comments: Assisted with staple removal from right hip, incision clean, dry and intact, steri strips applied   Neurological:      Mental Status: He is alert.         Assessment/Plan  Diagnoses and all orders for this visit:  S/P right hip fracture  Comments:  staples removed, cont with steri strips to area, follow up with ortho, therapies  Ataxia  Comments:  hospital bed at discharge  Anemia, unspecified  type  Comments:  CBC and BMP tomorrow           Electronically Signed By: SILVESTRE Ryan-CNP   9/3/24  2:02 PM

## 2024-09-04 ASSESSMENT — ENCOUNTER SYMPTOMS
VOMITING: 0
DIARRHEA: 0
SORE THROAT: 0
HEMATURIA: 0
COUGH: 0
CONSTIPATION: 0
HEADACHES: 0
NAUSEA: 0
FATIGUE: 0
ARTHRALGIAS: 1
DYSURIA: 0
FEVER: 0
ABDOMINAL PAIN: 0
CHILLS: 0
MYALGIAS: 0
SHORTNESS OF BREATH: 0

## 2024-09-04 NOTE — PROGRESS NOTES
Subjective   Patient ID: Uzair Enrique is a 89 y.o. male who is acute skilled care being seen and evaluated for multiple medical problems.    HPI patient here for skilled services after recent hospital stay with right hip fracture as well as trace subarachnoid hemorrhage.  He had a mechanical fall and hit his head slightly as well.  He underwent hemiarthroplasty on August 14.  Was diagnosed with urinary tract infection as well which is treated. Continues with PT and OT.  He refused labs and moca. Staples to come out next week .    Review of Systems   Constitutional:  Negative for chills, fatigue and fever.   HENT:  Negative for congestion and sore throat.    Eyes:  Negative for visual disturbance.   Respiratory:  Negative for cough and shortness of breath.    Cardiovascular:  Negative for chest pain.   Gastrointestinal:  Negative for abdominal pain, constipation, diarrhea, nausea and vomiting.   Genitourinary:  Negative for dysuria and hematuria.   Musculoskeletal:  Positive for arthralgias and gait problem. Negative for myalgias.   Skin:  Negative for rash.   Neurological:  Negative for headaches.       Objective   There were no vitals taken for this visit.    Physical Exam  Vitals reviewed: 129/66 98.2 64 18 wt 186.   Constitutional:       General: He is not in acute distress.     Appearance: Normal appearance.   HENT:      Head: Normocephalic.      Mouth/Throat:      Mouth: Mucous membranes are moist.      Pharynx: Oropharynx is clear.   Eyes:      Extraocular Movements: Extraocular movements intact.      Pupils: Pupils are equal, round, and reactive to light.   Cardiovascular:      Rate and Rhythm: Normal rate. Rhythm irregular.      Heart sounds: Normal heart sounds.   Pulmonary:      Effort: Pulmonary effort is normal.      Breath sounds: Normal breath sounds. No wheezing or rhonchi.   Abdominal:      General: There is no distension.      Palpations: Abdomen is soft.      Tenderness: There is no abdominal  tenderness.   Musculoskeletal:         General: No tenderness.      Right lower leg: No edema.      Left lower leg: No edema.   Lymphadenopathy:      Cervical: No cervical adenopathy.   Skin:     Coloration: Skin is not jaundiced.      Findings: No rash.   Neurological:      Mental Status: He is alert. Mental status is at baseline.      Cranial Nerves: No cranial nerve deficit.      Motor: Weakness present.      Gait: Gait abnormal.   Psychiatric:         Mood and Affect: Mood normal.         Assessment/Plan   Problem List Items Addressed This Visit             ICD-10-CM    Essential hypertension - Primary I10     Other Visit Diagnoses         Codes    Traumatic subarachnoid hemorrhage with unknown loss of consciousness status, subsequent encounter     S06.6XAD    Traumatic subdural hemorrhage without loss of consciousness, sequela (CMS-HCC)     S06.5X0S            Continue with PT and OT, he refused labs, monitor renal function as allowed and recheck CBC. Staples out next week. Continue with wound care.      Goals    None

## 2024-09-06 ENCOUNTER — NURSING HOME VISIT (OUTPATIENT)
Dept: POST ACUTE CARE | Facility: EXTERNAL LOCATION | Age: 89
End: 2024-09-06
Payer: MEDICARE

## 2024-09-06 DIAGNOSIS — Z87.81 S/P RIGHT HIP FRACTURE: Primary | ICD-10-CM

## 2024-09-06 DIAGNOSIS — E03.9 ACQUIRED HYPOTHYROIDISM: ICD-10-CM

## 2024-09-06 DIAGNOSIS — I25.118 ATHEROSCLEROTIC HEART DISEASE OF NATIVE CORONARY ARTERY WITH OTHER FORMS OF ANGINA PECTORIS (CMS-HCC): ICD-10-CM

## 2024-09-06 DIAGNOSIS — I10 ESSENTIAL HYPERTENSION: ICD-10-CM

## 2024-09-06 DIAGNOSIS — I48.20 CHRONIC ATRIAL FIBRILLATION (MULTI): ICD-10-CM

## 2024-09-06 PROCEDURE — 99315 NF DSCHRG MGMT 30 MIN/LESS: CPT | Performed by: FAMILY MEDICINE

## 2024-09-06 NOTE — LETTER
Patient: Uzair Enrique  : 1930    Encounter Date: 2024    Subjective  Patient ID: Uzair Enrique is a 89 y.o. male who is acute skilled care being seen and evaluated for multiple medical problems.    HPI patient here for skilled services after recent hospital stay with right hip fracture as well as trace subarachnoid hemorrhage.  He had a mechanical fall and hit his head slightly as well.  He underwent hemiarthroplasty on .  Was diagnosed with urinary tract infection which is now treated. He is set to go home next week.     Review of Systems   Constitutional:  Negative for chills, fatigue and fever.   HENT:  Negative for congestion and sore throat.    Eyes:  Negative for visual disturbance.   Respiratory:  Negative for cough and shortness of breath.    Cardiovascular:  Negative for chest pain.   Gastrointestinal:  Negative for abdominal pain, constipation, diarrhea, nausea and vomiting.   Genitourinary:  Negative for dysuria and hematuria.   Musculoskeletal:  Positive for arthralgias and gait problem. Negative for myalgias.   Skin:  Negative for rash.   Neurological:  Negative for headaches.       Objective  There were no vitals taken for this visit.    Physical Exam  Vitals reviewed: 132/70 98 73 18 wt 184-down 2 lbs.   Constitutional:       General: He is not in acute distress.     Appearance: Normal appearance.   HENT:      Head: Normocephalic.      Mouth/Throat:      Mouth: Mucous membranes are moist.      Pharynx: Oropharynx is clear.   Eyes:      Extraocular Movements: Extraocular movements intact.      Pupils: Pupils are equal, round, and reactive to light.   Cardiovascular:      Rate and Rhythm: Normal rate. Rhythm irregular.      Heart sounds: Normal heart sounds.   Pulmonary:      Effort: Pulmonary effort is normal.      Breath sounds: Normal breath sounds. No wheezing or rhonchi.   Abdominal:      General: There is no distension.      Palpations: Abdomen is soft.      Tenderness:  There is no abdominal tenderness.   Musculoskeletal:         General: No tenderness.      Right lower leg: No edema.      Left lower leg: No edema.   Lymphadenopathy:      Cervical: No cervical adenopathy.   Skin:     Coloration: Skin is not jaundiced.      Findings: No rash.   Neurological:      Mental Status: He is alert. Mental status is at baseline.      Cranial Nerves: No cranial nerve deficit.      Motor: Weakness present.      Gait: Gait abnormal.   Psychiatric:         Mood and Affect: Mood normal.         Assessment/Plan  Problem List Items Addressed This Visit             ICD-10-CM    Atherosclerotic heart disease of native coronary artery with other forms of angina pectoris (CMS-HCC) I25.118    Chronic atrial fibrillation (Multi) I48.20    Essential hypertension I10    Hypothyroid E03.9     Other Visit Diagnoses         Codes    S/P right hip fracture    -  Primary Z87.81          Less than 30 min spent arranging for dicharge    Completing pt and ot  Continue with wound care.      Goals    None           Electronically Signed By: Jessica Rodriguez MD   9/9/24  1:00 PM

## 2024-09-09 ASSESSMENT — ENCOUNTER SYMPTOMS
CONSTIPATION: 0
MYALGIAS: 0
DYSURIA: 0
HEADACHES: 0
SHORTNESS OF BREATH: 0
VOMITING: 0
FATIGUE: 0
DIARRHEA: 0
ABDOMINAL PAIN: 0
COUGH: 0
ARTHRALGIAS: 1
SORE THROAT: 0
CHILLS: 0
HEMATURIA: 0
FEVER: 0
NAUSEA: 0

## 2024-09-09 NOTE — PROGRESS NOTES
Subjective   Patient ID: Uzair Enrique is a 89 y.o. male who is acute skilled care being seen and evaluated for multiple medical problems.    HPI patient here for skilled services after recent hospital stay with right hip fracture as well as trace subarachnoid hemorrhage.  He had a mechanical fall and hit his head slightly as well.  He underwent hemiarthroplasty on August 14.  Was diagnosed with urinary tract infection which is now treated. He is set to go home next week.     Review of Systems   Constitutional:  Negative for chills, fatigue and fever.   HENT:  Negative for congestion and sore throat.    Eyes:  Negative for visual disturbance.   Respiratory:  Negative for cough and shortness of breath.    Cardiovascular:  Negative for chest pain.   Gastrointestinal:  Negative for abdominal pain, constipation, diarrhea, nausea and vomiting.   Genitourinary:  Negative for dysuria and hematuria.   Musculoskeletal:  Positive for arthralgias and gait problem. Negative for myalgias.   Skin:  Negative for rash.   Neurological:  Negative for headaches.       Objective   There were no vitals taken for this visit.    Physical Exam  Vitals reviewed: 132/70 98 73 18 wt 184-down 2 lbs.   Constitutional:       General: He is not in acute distress.     Appearance: Normal appearance.   HENT:      Head: Normocephalic.      Mouth/Throat:      Mouth: Mucous membranes are moist.      Pharynx: Oropharynx is clear.   Eyes:      Extraocular Movements: Extraocular movements intact.      Pupils: Pupils are equal, round, and reactive to light.   Cardiovascular:      Rate and Rhythm: Normal rate. Rhythm irregular.      Heart sounds: Normal heart sounds.   Pulmonary:      Effort: Pulmonary effort is normal.      Breath sounds: Normal breath sounds. No wheezing or rhonchi.   Abdominal:      General: There is no distension.      Palpations: Abdomen is soft.      Tenderness: There is no abdominal tenderness.   Musculoskeletal:         General:  No tenderness.      Right lower leg: No edema.      Left lower leg: No edema.   Lymphadenopathy:      Cervical: No cervical adenopathy.   Skin:     Coloration: Skin is not jaundiced.      Findings: No rash.   Neurological:      Mental Status: He is alert. Mental status is at baseline.      Cranial Nerves: No cranial nerve deficit.      Motor: Weakness present.      Gait: Gait abnormal.   Psychiatric:         Mood and Affect: Mood normal.         Assessment/Plan   Problem List Items Addressed This Visit             ICD-10-CM    Atherosclerotic heart disease of native coronary artery with other forms of angina pectoris (CMS-HCC) I25.118    Chronic atrial fibrillation (Multi) I48.20    Essential hypertension I10    Hypothyroid E03.9     Other Visit Diagnoses         Codes    S/P right hip fracture    -  Primary Z87.81          Less than 30 min spent arranging for dicharge    Completing pt and ot  Continue with wound care.      Goals    None